# Patient Record
Sex: FEMALE | Race: WHITE | NOT HISPANIC OR LATINO
[De-identification: names, ages, dates, MRNs, and addresses within clinical notes are randomized per-mention and may not be internally consistent; named-entity substitution may affect disease eponyms.]

---

## 2020-02-20 ENCOUNTER — APPOINTMENT (OUTPATIENT)
Dept: ORTHOPEDIC SURGERY | Facility: CLINIC | Age: 45
End: 2020-02-20
Payer: COMMERCIAL

## 2020-02-20 PROBLEM — Z00.00 ENCOUNTER FOR PREVENTIVE HEALTH EXAMINATION: Status: ACTIVE | Noted: 2020-02-20

## 2020-02-20 PROCEDURE — 99203 OFFICE O/P NEW LOW 30 MIN: CPT

## 2020-02-20 PROCEDURE — 73030 X-RAY EXAM OF SHOULDER: CPT | Mod: RT

## 2020-02-22 NOTE — ASSESSMENT
[FreeTextEntry1] : Discussed at length with patient current exam and minimal symptoms as well as MRI and treatment options.  Consideration to new MRI given likelihood of development of full thickness tear.  Risks and benefits detailed and patient elects observation

## 2020-02-22 NOTE — HISTORY OF PRESENT ILLNESS
[de-identified] : Patient is here for RIGHT shoulder pain that has been persisting for several years, atraumatic. Pain is aggravated by weightbearing and reaching above. SHe had an MRI on 1/22/19.\par MRI reveals a high-grade partial-thickness rotator cuff tear involving 85-90% of the tendon\par

## 2020-02-22 NOTE — HISTORY OF PRESENT ILLNESS
[de-identified] : Patient is here for RIGHT shoulder pain that has been persisting for several years, atraumatic. Pain is aggravated by weightbearing and reaching above. SHe had an MRI on 1/22/19.\par MRI reveals a high-grade partial-thickness rotator cuff tear involving 85-90% of the tendon\par

## 2020-02-22 NOTE — PHYSICAL EXAM
[de-identified] : Right Shoulder:\par \par Constitutional:\par The patient is healthy-appearing and in no apparent distress. \par \par Cardiovascular System: \par The capillary refill is less than 2 seconds. \par \par Skin: \par There are no skin abnormalities.\par \par C-Spine/Neck:\par \par Active Range of Motion:\par Flexion				50\par Extension			60\par Lateral rotation			80  \par \par Right Shoulder: \par Inspection: \par There is no atrophy, erythema, warmth, swelling.\par There is no scapular winging.\par There is no AC prominence. \par \par Bony Palpation: \par There is no tenderness of the clavicle.\par There is no tenderness of the acromioclavicular joint.\par There is no tenderness of the greater tuberosity. \par There is no tenderness of the bicipital groove.\par  \par Soft Tissue Palpation: \par There is no tenderness of the trapezius.\par There is no tenderness of the rhomboid.\par There is no tenderness of the subacromial bursa. \par \par Active Range of Motion: \par Forward flexion- 				180 \par Abduction-					150\par External rotation at 0 degrees abduction-	80 \par Internal rotation at 0 degrees abduction-	80\par \par Passive Range of Motion: \par Forward flexion- 			180 \par Abduction-				150\par External rotation at 0 deg abduction-	80 \par Internal rotation at 0 deg abduction-	80\par \par Special Tests: \par Hawkin's  				Positive \par Neer's  				Positive \par Speed's  				Negative\par AC cross-over 			            Negative\par [place]'s  				Negative\par \par Stability: \par There is no general laxity. \par \par Psychiatric: \par The patient demonstrates a normal mood and affect and is active and alert\par  [de-identified] : X-ray RIGHT shoulder:  There is no fracture, arthritis or significant bony abnormality\par \par MRI of RIGHT shoulder 2019:  There is a high grade near full thickness supraspinatus tear

## 2020-03-03 ENCOUNTER — APPOINTMENT (OUTPATIENT)
Dept: ORTHOPEDIC SURGERY | Facility: CLINIC | Age: 45
End: 2020-03-03
Payer: COMMERCIAL

## 2020-03-03 DIAGNOSIS — M23.90 UNSPECIFIED INTERNAL DERANGEMENT OF UNSPECIFIED KNEE: ICD-10-CM

## 2020-03-03 PROCEDURE — 99213 OFFICE O/P EST LOW 20 MIN: CPT

## 2020-03-03 PROCEDURE — 73562 X-RAY EXAM OF KNEE 3: CPT | Mod: LT

## 2020-03-03 RX ORDER — DIAZEPAM 5 MG/1
5 TABLET ORAL
Qty: 1 | Refills: 0 | Status: ACTIVE | COMMUNITY
Start: 2020-03-03 | End: 1900-01-01
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
## 2020-03-03 NOTE — PHYSICAL EXAM
[de-identified] : \par Left knee\par \par Constitutional: \par The patient is healthy-appearing and in no apparent distress. \par \par Gait:\par The patient ambulates with a limp.\par \par Cardiovascular System: \par The capillary refill is less than 2 seconds. \par \par Skin: \par There are no skin abnormalities.  There is a moderate effusion\par \par Left Knee:\par  \par Bony Palpation: \par There is no tenderness of the medial joint line. \par There is no tenderness of the lateral joint line.\par There is no tenderness of the medial femoral chondyle.\par There is no tenderness of the lateral femoral chondyle.\par There is no tenderness of the tibial tubercle.\par There is no tenderness of the superior patella.\par There is no tenderness of the inferior patella.\par There is no tenderness of the medial patellar facet.\par There is no tenderness of the lateral patellar facet.\par \par Soft Tissue Palpation: \par There is no tenderness of the medial retinaculum.\par There is no tenderness of the lateral retinaculum.\par There is no tenderness of the quadriceps tendon.\par There is no tenderness of the patella tendon.\par There is no tenderness of the ITB.\par There is no tenderness of the pes anserine.\par \par Active Range of Motion: \par The range of motion at the knee actively and passively is 0 - 130 with pain at end flexion. \par \par Special Tests: \par There is a negative Apley.\par There is a negative Steinmanns. \par There is a POSITIVE Lachman and Anterior Drawer.\par There is a negative Posterior Drawer.  \par There is no varus or valgus laxity.\par \par Strength: \par There is 5/5 hip flexion and 5/5 knee flexion and extension.  \par \par Psychiatric: \par The patient demonstrates a normal mood and affect and is active and alert [de-identified] : X-ray left knee.  There is no significant bony / soft tissue abnormality, arthritis, or fracture.

## 2020-03-16 ENCOUNTER — APPOINTMENT (OUTPATIENT)
Dept: ORTHOPEDIC SURGERY | Facility: CLINIC | Age: 45
End: 2020-03-16
Payer: COMMERCIAL

## 2020-03-16 DIAGNOSIS — M75.110 INCOMPLETE ROTATOR CUFF TEAR OR RUPTURE OF UNSPECIFIED SHOULDER, NOT SPECIFIED AS TRAUMATIC: ICD-10-CM

## 2020-03-16 PROCEDURE — 99213 OFFICE O/P EST LOW 20 MIN: CPT

## 2020-03-17 PROBLEM — M75.110 PARTIAL TEAR OF ROTATOR CUFF: Status: ACTIVE | Noted: 2020-02-22
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
## 2020-03-17 NOTE — PHYSICAL EXAM
Detail Level: Detailed Quality 402: Tobacco Use And Help With Quitting Among Adolescents: Patient screened for tobacco and never smoked Quality 110: Preventive Care And Screening: Influenza Immunization: Influenza Immunization Administered during Influenza season [de-identified] : \par Left knee\par \par Constitutional: \par The patient is healthy-appearing and in no apparent distress. \par \par Gait:\par The patient ambulates with a limp.\par \par Cardiovascular System: \par The capillary refill is less than 2 seconds. \par \par Skin: \par There are no skin abnormalities.  There is a moderate effusion\par \par Left Knee:\par  \par Bony Palpation: \par There is no tenderness of the medial joint line. \par There is no tenderness of the lateral joint line.\par There is no tenderness of the medial femoral chondyle.\par There is no tenderness of the lateral femoral chondyle.\par There is no tenderness of the tibial tubercle.\par There is no tenderness of the superior patella.\par There is no tenderness of the inferior patella.\par There is no tenderness of the medial patellar facet.\par There is no tenderness of the lateral patellar facet.\par \par Soft Tissue Palpation: \par There is no tenderness of the medial retinaculum.\par There is no tenderness of the lateral retinaculum.\par There is no tenderness of the quadriceps tendon.\par There is no tenderness of the patella tendon.\par There is no tenderness of the ITB.\par There is no tenderness of the pes anserine.\par \par Active Range of Motion: \par The range of motion at the knee actively and passively is 0 - 130 with pain at end flexion. \par \par Special Tests: \par There is a negative Apley.\par There is a negative Steinmanns. \par There is a POSITIVE Lachman and Anterior Drawer.\par There is a negative Posterior Drawer.  \par There is no varus or valgus laxity.\par \par Strength: \par There is 5/5 hip flexion and 5/5 knee flexion and extension.  \par \par Psychiatric: \par The patient demonstrates a normal mood and affect and is active and alert [de-identified] : MRI left knee.  There is a complete proximal ACL tear with corresponding bone bruises as well as a complex small medial meniscus tear in the posterior aspect of the meniscus.\par \par MRI right shoulder.  There is a partial high-grade supraspinatus rotator cuff tear with tendinitis and bursitis.
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
## 2020-05-27 ENCOUNTER — APPOINTMENT (OUTPATIENT)
Dept: ORTHOPEDIC SURGERY | Facility: CLINIC | Age: 45
End: 2020-05-27
Payer: COMMERCIAL

## 2020-05-27 PROCEDURE — 99212 OFFICE O/P EST SF 10 MIN: CPT | Mod: 95

## 2020-05-27 NOTE — PHYSICAL EXAM
[de-identified] : Exam deferred.  Discussion at length with patient for 10 minutes on possible timing of surgery resuming and her desire to proceed ASAP.  Reviewed at length timing ( hopefully within the next 1-2 weeks) and that I will have her at the "front of the list"

## 2020-05-27 NOTE — HISTORY OF PRESENT ILLNESS
[de-identified] : Telehealth video appointment was performed using [employer] application from 3:01 pm  to  3:12 .\par The patient was sent written consent forms regarding TeleHealth and verbal consent to proceed with a TeleHealth appointment was given by the patient today.  Patient understands that TeleHealth appointments may be limited in regards to diagnosis and treatment recommendations compared to in-person / in-office consultations and if persistent concerns/symptoms, an in-office evaluation would be recommended.  The patient elects to proceed with a TeleHealth appointment today.\par Patient is an established patient calling to discuss her current symptoms as well as for an update on when surgery may resume and surgery recovery.

## 2020-06-16 ENCOUNTER — LABORATORY RESULT (OUTPATIENT)
Age: 45
End: 2020-06-16

## 2020-06-18 RX ORDER — OXYCODONE AND ACETAMINOPHEN 5; 325 MG/1; MG/1
5-325 TABLET ORAL
Qty: 30 | Refills: 0 | Status: ACTIVE | COMMUNITY
Start: 2020-06-18 | End: 1900-01-01

## 2020-06-19 ENCOUNTER — APPOINTMENT (OUTPATIENT)
Dept: ORTHOPEDIC SURGERY | Facility: AMBULATORY SURGERY CENTER | Age: 45
End: 2020-06-19
Payer: COMMERCIAL

## 2020-06-19 PROCEDURE — 29881 ARTHRS KNE SRG MNISECTMY M/L: CPT | Mod: LT,59

## 2020-06-19 PROCEDURE — 29888 ARTHRS AID ACL RPR/AGMNTJ: CPT | Mod: LT

## 2020-06-23 ENCOUNTER — APPOINTMENT (OUTPATIENT)
Dept: ORTHOPEDIC SURGERY | Facility: CLINIC | Age: 45
End: 2020-06-23
Payer: COMMERCIAL

## 2020-06-23 VITALS — TEMPERATURE: 97 F

## 2020-06-23 PROCEDURE — 99024 POSTOP FOLLOW-UP VISIT: CPT

## 2020-06-23 NOTE — HISTORY OF PRESENT ILLNESS
[de-identified] : s/p LEFT knee ACL reconstruction (hamstring Graftlink), partial medial menisectomy [de-identified] : Patient is 4 days postop.  States pain improving.  Denies paresthesias. [de-identified] : s/p LEFT knee ACL reconstruction (hamstring Graftlink), partial medial menisectomy [de-identified] : On exam the left knee,  postop hinged knee brace intact as well as dressing.  Dressing was removed with intact incisions with sutures.  There is a moderate effusion consistent with expected.  There is normal sensation light touch throughout the leg and particularly in the saphenous distribution.  Range of motion is full extension to 30° with pain at end flexion.  There is a negative Lachman.  Negative Jossy\par  [de-identified] : WBAT in brace.  Begin PT.  Advance ROM.  Follow-up in one week

## 2020-07-01 ENCOUNTER — APPOINTMENT (OUTPATIENT)
Dept: ORTHOPEDIC SURGERY | Facility: CLINIC | Age: 45
End: 2020-07-01
Payer: COMMERCIAL

## 2020-07-01 VITALS — TEMPERATURE: 96.6 F

## 2020-07-01 PROCEDURE — 99024 POSTOP FOLLOW-UP VISIT: CPT

## 2020-07-01 NOTE — HISTORY OF PRESENT ILLNESS
[de-identified] : Patient is 12 days postop.  States pain improving.  Denies paresthesias.  States PT starting tonight [de-identified] : s/p LEFT knee ACL reconstruction (hamstring Graftlink), partial medial menisectomy [de-identified] : s/p LEFT knee ACL reconstruction (hamstring Graftlink), partial medial menisectomy [de-identified] : On exam the left knee,  postop hinged knee brace intact as well as dressing.  Dressing was removed with intact incisions- sutures removed.  There is a moderate effusion consistent with expected.  There is normal sensation light touch throughout the leg and particularly in the saphenous distribution.  Range of motion is full extension to 30° with pain at end flexion.  There is a negative Lachman.  Negative Jossy\par  [de-identified] : WBAT in brace.  Begin PT.  Advance ROM.  Follow-up in two weeks

## 2020-07-16 ENCOUNTER — APPOINTMENT (OUTPATIENT)
Dept: ORTHOPEDIC SURGERY | Facility: CLINIC | Age: 45
End: 2020-07-16
Payer: COMMERCIAL

## 2020-07-16 VITALS — TEMPERATURE: 97.3 F

## 2020-07-16 PROCEDURE — 99024 POSTOP FOLLOW-UP VISIT: CPT

## 2020-07-16 NOTE — HISTORY OF PRESENT ILLNESS
[de-identified] : s/p LEFT knee ACL reconstruction (hamstring Graftlink), partial medial menisectomy. \par Patient is 1 month postop.  States overall doing well.  States soreness but not requiring any pain medication

## 2020-07-16 NOTE — PHYSICAL EXAM
[de-identified] : Left knee\par \par Constitutional: \par The patient is healthy-appearing and in no apparent distress. \par \par Gait:\par The patient ambulates with a normal gait and no limp.\par \par Cardiovascular System: \par The capillary refill is less than 2 seconds. \par There is a mild effusion c/w expected postop.\par \par Skin: \par There are no skin abnormalities.\par \par Left Knee:\par  \par Bony Palpation: \par There is no tenderness of the medial joint line. \par There is no tenderness of the lateral joint line.\par There is no tenderness of the medial femoral chondyle.\par There is no tenderness of the lateral femoral chondyle.\par There is no tenderness of the tibial tubercle.\par There is no tenderness of the superior patella.\par There is no tenderness of the inferior patella.\par There is no tenderness of the medial patellar facet.\par There is no tenderness of the lateral patellar facet.\par \par Soft Tissue Palpation: \par There is no tenderness of the medial retinaculum.\par There is no tenderness of the lateral retinaculum.\par There is no tenderness of the quadriceps tendon.\par There is no tenderness of the patella tendon.\par There is no tenderness of the ITB.\par There is no tenderness of the pes anserine.\par \par Active Range of Motion: \par The range of motion at the knee actively and passively is 0 - 140. \par \par Special Tests: \par There is a negative Apley.\par There is a negative Steinmanns. \par There is a negative Lachman and Anterior Drawer.\par There is a negative Posterior Drawer.  \par There is no varus or valgus laxity.\par \par Strength: \par There is 5/5 hip flexion and 5/5 knee flexion and extension.  \par \par Psychiatric: \par The patient demonstrates a normal mood and affect and is active and alert\par

## 2020-07-16 NOTE — ASSESSMENT
[FreeTextEntry1] : Discussed at length overall clinical improvement and activity restrictions.  Follow-up in 1 month.

## 2020-08-20 ENCOUNTER — APPOINTMENT (OUTPATIENT)
Dept: ORTHOPEDIC SURGERY | Facility: CLINIC | Age: 45
End: 2020-08-20
Payer: COMMERCIAL

## 2020-08-20 VITALS — HEIGHT: 64.96 IN | BODY MASS INDEX: 22.04 KG/M2 | WEIGHT: 132.28 LBS

## 2020-08-20 VITALS — TEMPERATURE: 97.6 F

## 2020-08-20 PROCEDURE — 99024 POSTOP FOLLOW-UP VISIT: CPT

## 2020-08-20 NOTE — HISTORY OF PRESENT ILLNESS
[de-identified] : s/p LEFT knee ACL reconstruction (hamstring Graftlink), partial medial menisectomy [de-identified] : s/p LEFT knee ACL reconstruction (hamstring Graftlink), partial medial menisectomy [de-identified] : On exam the left knee, incisions are CDI.  There is a mild effusion consistent with expected.  There is normal sensation light touch throughout the leg and particularly in the saphenous distribution.  Range of motion is full extension to 150°.  There is negative Lachman.  Negative Jossy\par  [de-identified] : Patient is 2 months postop.  States pain improving.  Denies paresthesias [de-identified] : Advance PT and home strengthening.  Activity restrictions discussed.  Follow-up in 2 months

## 2020-09-30 ENCOUNTER — APPOINTMENT (OUTPATIENT)
Dept: ORTHOPEDIC SURGERY | Facility: CLINIC | Age: 45
End: 2020-09-30
Payer: COMMERCIAL

## 2020-09-30 DIAGNOSIS — S83.249A OTHER TEAR OF MEDIAL MENISCUS, CURRENT INJURY, UNSPECIFIED KNEE, INITIAL ENCOUNTER: ICD-10-CM

## 2020-09-30 DIAGNOSIS — S83.519A SPRAIN OF ANTERIOR CRUCIATE LIGAMENT OF UNSPECIFIED KNEE, INITIAL ENCOUNTER: ICD-10-CM

## 2020-09-30 PROCEDURE — 99212 OFFICE O/P EST SF 10 MIN: CPT | Mod: 95

## 2020-09-30 NOTE — PHYSICAL EXAM
[de-identified] : On exam the left knee there is a minimal swelling compared to contralateral knee which is consistent with expectation.  Range of motion is full extension to 150°.  Patient is able to actively flex and extend the knee without difficulty and go from a sitting to standing position without difficulty

## 2020-09-30 NOTE — ASSESSMENT
[FreeTextEntry1] : Discussed at length the patient overall clinical progress as well as Telehealth exam.    Activity restrictions reviewed and patient may begin to gently perform any activities with slight jogging in approximately 3-4 weeks.    No jumping twisting or sporting activities and patient is to followup in 2 months

## 2020-09-30 NOTE — HISTORY OF PRESENT ILLNESS
[de-identified] : Telehealth video appointment was performed using [employer] from 3:09 pm to 3:19 .\par The patient was sent written consent forms regarding TeleHealth and verbal consent to proceed with a TeleHealth appointment was given by the patient today.  Patient understands that TeleHealth appointments may be limited in regards to diagnosis and treatment recommendations compared to in-person / in-office consultations and if persistent concerns/symptoms, an in-office evaluation would be recommended.  The patient elects to proceed with a TeleHealth appointment today.\par Patient is 3.5 months s/p LEFT ACL reconstruction, partial menisectomy.  States overall doing well and PT going well.  Denies any instability.  Denies need for pain medication.

## 2024-03-06 ENCOUNTER — APPOINTMENT (OUTPATIENT)
Dept: ORTHOPEDIC SURGERY | Facility: CLINIC | Age: 49
End: 2024-03-06
Payer: COMMERCIAL

## 2024-03-06 PROCEDURE — 99213 OFFICE O/P EST LOW 20 MIN: CPT

## 2024-03-07 NOTE — HISTORY OF PRESENT ILLNESS
[de-identified] : Last Visit: Right shoulder  Reason: Follow-up on rotator cuff tear Symptoms:  dull constant / sharp occasionally / radiates down arm with exercise  Pain Level: Mild

## 2024-03-07 NOTE — PHYSICAL EXAM
[de-identified] : Right Shoulder: Constitutional: The patient is healthy-appearing and in no apparent distress.   Cardiovascular System:  The capillary refill is less than 2 seconds.   Skin:  There are no skin abnormalities.  C-Spine/Neck:  Active Range of Motion: Flexion				50 Extension			60 Lateral rotation			80    Right Shoulder:  Inspection:  There is no atrophy, erythema, warmth, swelling. There is no scapular winging. There is no AC prominence.   Bony Palpation:  There is no tenderness of the clavicle. There is no tenderness of the acromioclavicular joint. There is no tenderness of the greater tuberosity.  There is no tenderness of the bicipital groove.   Soft Tissue Palpation:  There is no tenderness of the trapezius. There is no tenderness of the rhomboid. There is tenderness of the subacromial bursa.   Active Range of Motion:  Forward flexion- 				180  Abduction-					150 External rotation at 0 degrees abduction-	80  Internal rotation at 0 degrees abduction-	80  Passive Range of Motion:  Forward flexion- 			180  Abduction-				150 External rotation at 0 deg abduction-	80  Internal rotation at 0 deg abduction-	80  Special Tests:  Hawkin's  				Positive  Neer's  				Positive  Speed's  				Negative AC cross-over 			            Negative [place]'s  				Negative  Stability:  There is no general laxity.  There is no anterior apprehension.  Strength:  Supraspinatus abduction 		4+/5 External rotation at 0 deg abduction 	5/5 Internal rotation at 0 deg abduction	5/5 Scapular elevation 			5/5   Neurological System:   There is normal sensation to light touch C5-T1.   Stability:  There is no general laxity.   Psychiatric:  The patient demonstrates a normal mood and affect and is active and alert.

## 2024-03-19 RX ORDER — DIAZEPAM 5 MG/1
5 TABLET ORAL
Qty: 2 | Refills: 0 | Status: ACTIVE | COMMUNITY
Start: 2024-03-19 | End: 1900-01-01

## 2024-05-09 RX ORDER — OXYCODONE AND ACETAMINOPHEN 5; 325 MG/1; MG/1
5-325 TABLET ORAL
Qty: 40 | Refills: 0 | Status: ACTIVE | COMMUNITY
Start: 2024-05-09 | End: 1900-01-01

## 2024-05-10 ENCOUNTER — APPOINTMENT (OUTPATIENT)
Age: 49
End: 2024-05-10
Payer: COMMERCIAL

## 2024-05-10 ENCOUNTER — TRANSCRIPTION ENCOUNTER (OUTPATIENT)
Age: 49
End: 2024-05-10

## 2024-05-10 PROCEDURE — 29827 SHO ARTHRS SRG RT8TR CUF RPR: CPT | Mod: RT

## 2024-05-10 PROCEDURE — 29826 SHO ARTHRS SRG DECOMPRESSION: CPT | Mod: RT,59

## 2024-05-10 PROCEDURE — 29820 SHO ARTHRS SRG PRTL SYNVCT: CPT | Mod: RT,59

## 2024-05-14 ENCOUNTER — APPOINTMENT (OUTPATIENT)
Dept: ORTHOPEDIC SURGERY | Facility: CLINIC | Age: 49
End: 2024-05-14
Payer: COMMERCIAL

## 2024-05-14 PROCEDURE — 99024 POSTOP FOLLOW-UP VISIT: CPT

## 2024-05-16 NOTE — HISTORY OF PRESENT ILLNESS
[de-identified] : Status post right shoulder arthroscopy with rotator cuff repair subacromial decompression and limited synovectomy and labral debridement [de-identified] : Patient is 4 days postop states overall she is doing rather well with pain controlled she is maintaining the sling at all times except for daily elbow range of motion exercises.  Patient denies any paresthesias [de-identified] : On evaluation the right shoulder wounds are clean dry and intact they were removed and Steri-Stripped there is normal sensation light touch C5 T1 full elbow range of motion [de-identified] : Status post right shoulder arthroscopy with rotator cuff repair subacromial decompression and limited synovectomy and labral debridement [de-identified] : Reviewed at length with patient surgery postop protocol she is to maintain the sling at all times for the next 3 weeks with follow-up in 3 weeks except for daily elbow range of motion exercises

## 2024-05-20 ENCOUNTER — NON-APPOINTMENT (OUTPATIENT)
Age: 49
End: 2024-05-20

## 2024-06-04 ENCOUNTER — APPOINTMENT (OUTPATIENT)
Dept: ORTHOPEDIC SURGERY | Facility: CLINIC | Age: 49
End: 2024-06-04
Payer: COMMERCIAL

## 2024-06-04 PROCEDURE — 99024 POSTOP FOLLOW-UP VISIT: CPT

## 2024-06-04 NOTE — HISTORY OF PRESENT ILLNESS
[de-identified] : Status post right shoulder arthroscopy with rotator cuff repair subacromial decompression and limited synovectomy and labral debridement [de-identified] : Patient is 3.5 weeks postop states overall she is doing rather well and maintaining the sling.  Patient denies any paresthesias [de-identified] : On evaluation the right shoulder wounds are healed.  There is normal sensation light touch C5 T1 full elbow range of motion.  AROM FF: 40 , ER 25 and IR  and PROM FF: 120, ER: 40 and IR with 5/5 supra and ER [de-identified] : Status post right shoulder arthroscopy with rotator cuff repair subacromial decompression and limited synovectomy and labral debridement [de-identified] : Discussed at length with patient postop protocol she is to DC the sling at this time begin physical therapy and follow-up in 4 weeks

## 2024-06-25 ENCOUNTER — APPOINTMENT (OUTPATIENT)
Dept: ORTHOPEDIC SURGERY | Facility: CLINIC | Age: 49
End: 2024-06-25
Payer: COMMERCIAL

## 2024-06-25 DIAGNOSIS — S46.011D STRAIN OF MUSCLE(S) AND TENDON(S) OF THE ROTATOR CUFF OF RIGHT SHOULDER, SUBSEQUENT ENCOUNTER: ICD-10-CM

## 2024-06-25 PROCEDURE — 99024 POSTOP FOLLOW-UP VISIT: CPT

## 2024-06-28 PROBLEM — S46.011D TRAUMATIC COMPLETE TEAR OF RIGHT ROTATOR CUFF, SUBSEQUENT ENCOUNTER: Status: ACTIVE | Noted: 2024-03-06

## 2025-07-07 NOTE — PHYSICAL EXAM
[de-identified] : Right Shoulder:\par \par Constitutional:\par The patient is healthy-appearing and in no apparent distress. \par \par Cardiovascular System: \par The capillary refill is less than 2 seconds. \par \par Skin: \par There are no skin abnormalities.\par \par C-Spine/Neck:\par \par Active Range of Motion:\par Flexion				50\par Extension			60\par Lateral rotation			80  \par \par Right Shoulder: \par Inspection: \par There is no atrophy, erythema, warmth, swelling.\par There is no scapular winging.\par There is no AC prominence. \par \par Bony Palpation: \par There is no tenderness of the clavicle.\par There is no tenderness of the acromioclavicular joint.\par There is no tenderness of the greater tuberosity. \par There is no tenderness of the bicipital groove.\par  \par Soft Tissue Palpation: \par There is no tenderness of the trapezius.\par There is no tenderness of the rhomboid.\par There is no tenderness of the subacromial bursa. \par \par Active Range of Motion: \par Forward flexion- 				180 \par Abduction-					150\par External rotation at 0 degrees abduction-	80 \par Internal rotation at 0 degrees abduction-	80\par \par Passive Range of Motion: \par Forward flexion- 			180 \par Abduction-				150\par External rotation at 0 deg abduction-	80 \par Internal rotation at 0 deg abduction-	80\par \par Special Tests: \par Hawkin's  				Positive \par Neer's  				Positive \par Speed's  				Negative\par AC cross-over 			            Negative\par [place]'s  				Negative\par \par Stability: \par There is no general laxity. \par \par Psychiatric: \par The patient demonstrates a normal mood and affect and is active and alert\par  [de-identified] : X-ray RIGHT shoulder:  There is no fracture, arthritis or significant bony abnormality\par \par MRI of RIGHT shoulder 2019:  There is a high grade near full thickness supraspinatus tear none